# Patient Record
Sex: FEMALE | Race: WHITE
[De-identification: names, ages, dates, MRNs, and addresses within clinical notes are randomized per-mention and may not be internally consistent; named-entity substitution may affect disease eponyms.]

---

## 2018-09-04 ENCOUNTER — HOSPITAL ENCOUNTER (EMERGENCY)
Dept: HOSPITAL 25 - UCCORT | Age: 83
Discharge: HOME | End: 2018-09-04
Payer: MEDICARE

## 2018-09-04 VITALS — DIASTOLIC BLOOD PRESSURE: 90 MMHG | SYSTOLIC BLOOD PRESSURE: 146 MMHG

## 2018-09-04 DIAGNOSIS — Z88.2: ICD-10-CM

## 2018-09-04 DIAGNOSIS — N39.0: Primary | ICD-10-CM

## 2018-09-04 PROCEDURE — 81003 URINALYSIS AUTO W/O SCOPE: CPT

## 2018-09-04 PROCEDURE — 99212 OFFICE O/P EST SF 10 MIN: CPT

## 2018-09-04 PROCEDURE — G0463 HOSPITAL OUTPT CLINIC VISIT: HCPCS

## 2018-09-04 PROCEDURE — 87086 URINE CULTURE/COLONY COUNT: CPT

## 2018-09-04 NOTE — UC
Complaint Female HPI





- HPI Summary


HPI Summary: 





urinary frequency x 1 day 


has been urinating multiple times yesterday and woke up at least 4 times last 

night 


no dysuria, no fever, no chills, no flank pain 





- History Of Current Complaint


Chief Complaint: UCGU


Stated Complaint: URINARY COMPLAINT


Time Seen by Provider: 09/04/18 11:23


Hx Obtained From: Patient


Onset/Duration: Gradual Onset, Lasting Days - 1, Still Present


Timing: Constant


Severity Initially: Moderate


Severity Currently: Moderate


Pain Intensity: 97


Character: Not Applicable


Alleviating Factor(s): Position


Associated Signs And Symptoms: Negative: Fever, Back Pain, Vaginal Bleeding/

Discharge, Vaginal Discharge, Nausea, Vomiting(# Of Episodes =), Genital 

Swelling, Genital Blisters, Retained Foregin Body (Specify)





- Allergies/Home Medications


Allergies/Adverse Reactions: 


 Allergies











Allergy/AdvReac Type Severity Reaction Status Date / Time


 


Sulfa (Sulfonamide Allergy Unknown Hives Verified 09/04/18 11:24





Antibiotics)     











Home Medications: 


 Home Medications





Acetaminophen [Arthritis Pain Relief] 2 cap PO Q8H 09/04/18 [History Confirmed 

09/04/18]


Calcium Citrate TAB* [Citracal TAB*] 200 mg PO BID 09/04/18 [History Confirmed 

09/04/18]


Multivitamin/Iron/Folic Acid [Centrum Adults Tablet] 1 each PO 09/04/18 [History

]


Omega-3 Fatty Acids/Fish Oil [Fish Oil 1,000 mg Capsule] 1 each PO BID 09/04/18 

[History Confirmed 09/04/18]


Otc Arthritis Med 1 tab PO BID 09/04/18 [History]











PMH/Surg Hx/FS Hx/Imm Hx





- Additional Past Medical History


Additional PMH: 





DVT x 2





- Surgical History


Surgical History: Yes


Surgery Procedure, Year, and Place: Lt KNEE - REPAIRED MENISCUS.  Rt FOOT- 

TENDON REPAIRED





- Family History


Known Family History: Positive: Hypertension





- Social History


Alcohol Use: Daily


Substance Use Type: None


Smoking Status (MU): Never Smoked Tobacco





Review of Systems


Constitutional: Negative


Skin: Negative


Eyes: Negative


ENT: Negative


Respiratory: Negative


Cardiovascular: Negative


Gastrointestinal: Negative


Genitourinary: Frequency


Is Patient Immunocompromised?: No


All Other Systems Reviewed And Are Negative: Yes





Physical Exam


Triage Information Reviewed: Yes


Appearance: Well-Appearing, No Pain Distress, Well-Nourished


Vital Signs: 


 Initial Vital Signs











Temp  98.4 F   09/04/18 11:31


 


Pulse  84   09/04/18 11:31


 


Resp  18   09/04/18 11:31


 


BP  146/90   09/04/18 11:31


 


Pulse Ox  97   09/04/18 11:31











Vital Signs Reviewed: Yes


Eyes: Positive: Conjunctiva Clear


ENT: Positive: Normal ENT inspection, Hearing grossly normal, Pharynx normal


Neck: Positive: Supple, Nontender, No Lymphadenopathy


Respiratory: Positive: Chest non-tender, Lungs clear, Normal breath sounds


Cardiovascular: Positive: RRR, No Murmur, Pulses Normal


Abdominal Exam: Normal


Abdomen Description: Positive: Nontender, No Organomegaly, Soft.  Negative: CVA 

Tenderness (R), CVA Tenderness (L), Distended, Guarding


Bowel Sounds: Positive: Present


Skin Exam: Normal





 Complaint Female Dx





- Differential Dx/Diagnosis


Provider Diagnoses: UTI





Discharge





- Sign-Out/Discharge


Documenting (check all that apply): Patient Departure


All imaging exams completed and their final reports reviewed: No Studies





- Discharge Plan


Condition: Stable


Disposition: HOME


Prescriptions: 


Nitrofurantoin Monohyd/M-Cryst [Macrobid 100 mg Capsule] 100 mg PO BID #14 cap


Patient Education Materials:  Urinary Tract Infection in Older Adults (ED)


Referrals: 


Cherry Juarez MD [Primary Care Provider] - 7 Days





- Billing Disposition and Condition


Condition: STABLE


Disposition: Home

## 2018-12-19 ENCOUNTER — HOSPITAL ENCOUNTER (EMERGENCY)
Dept: HOSPITAL 25 - UCCORT | Age: 83
Discharge: HOME | End: 2018-12-19
Payer: MEDICARE

## 2018-12-19 VITALS — SYSTOLIC BLOOD PRESSURE: 146 MMHG | DIASTOLIC BLOOD PRESSURE: 86 MMHG

## 2018-12-19 DIAGNOSIS — L03.012: Primary | ICD-10-CM

## 2018-12-19 DIAGNOSIS — Z88.2: ICD-10-CM

## 2018-12-19 PROCEDURE — G0463 HOSPITAL OUTPT CLINIC VISIT: HCPCS

## 2018-12-19 PROCEDURE — 99211 OFF/OP EST MAY X REQ PHY/QHP: CPT

## 2018-12-19 NOTE — UC
Skin Complaint HPI





- HPI Summary


HPI Summary: 





Pt presents with c/o sudden onset of left index finger bruising and swelling.  

Pt is concerned that she has "cellulitis". Pt denies significant trauma or 

injury.  Pt reports that 3 days ago she felt sudden onset of mild tenderness in 

left index finger. Reports that left index finger is slightly swollen and 

bruised.  bruising has improved from initial onset.  





- History of Current Complaint


Chief Complaint: UCUpperExtremity


Time Seen by Provider: 12/19/18 10:08


Stated Complaint: LEFT INDEX FINGER CONCERN


Hx Obtained From: Patient


Pregnant?: No


Onset/Duration: Sudden Onset, Lasting Days, Still Present


Skin Exposure Onset/Duration: Days Ago


Timing: Constant


Onset Severity: Mild


Current Severity: Mild


Pain Intensity: 0


Location: Hand (Left) - index finger


Character: Swelling, Redness


Aggravating Factor(s): Nothing


Alleviating Factor(s): Unknown


Associated Signs & Symptoms: Positive: Bruising





- Allergy/Home Medications


Allergies/Adverse Reactions: 


 Allergies











Allergy/AdvReac Type Severity Reaction Status Date / Time


 


Sulfa (Sulfonamide Allergy Unknown Hives Verified 12/19/18 10:21





Antibiotics)     











Home Medications: 


 Home Medications





Acetaminophen [Tylenol Arthritis] 1,300 mg PO BID 12/19/18 [History Confirmed 12 /19/18]


Mirabegron (NF) [Myrbetriq (NF)] 25 mg PO DAILY 12/19/18 [History Confirmed 12/ 19/18]











PMH/Surg Hx/FS Hx/Imm Hx


Previously Healthy: Yes


Cardiovascular History: Cardiac Disease





- Surgical History


Surgical History: Yes


Surgery Procedure, Year, and Place: Lt KNEE - REPAIRED MENISCUS.  Rt FOOT- 

TENDON REPAIRED





- Family History


Known Family History: Positive: Hypertension





- Social History


Occupation: Retired


Alcohol Use: Daily


Alcohol Amount: 1 beer


Substance Use Type: None


Smoking Status (MU): Never Smoked Tobacco


Have You Smoked in the Last Year: No





Review of Systems


All Other Systems Reviewed And Are Negative: Yes


Constitutional: Positive: Negative


Skin: Positive: Bruising - left index finger


Eyes: Positive: Negative


ENT: Positive: Negative


Respiratory: Positive: Negative


Cardiovascular: Positive: Negative


Gastrointestinal: Positive: Negative


Genitourinary: Positive: Negative


Motor: Positive: Negative


Neurovascular: Positive: Negative


Musculoskeletal: Positive: Edema - left index finger


Neurological: Positive: Negative


Psychological: Positive: Negative


Is Patient Immunocompromised?: No





Physical Exam


Triage Information Reviewed: Yes


Appearance: Well-Appearing


Vital Signs: 


 Initial Vital Signs











Temp  98 F   12/19/18 10:25


 


Pulse  85   12/19/18 10:25


 


Resp  16   12/19/18 10:25


 


BP  146/86   12/19/18 10:25


 


Pulse Ox  97   12/19/18 10:25











Vital Signs Reviewed: Yes


Eye Exam: Normal


ENT Exam: Normal


Dental Exam: Normal


Neck exam: Normal


Respiratory Exam: Normal


Respiratory: Positive: Normal breath sounds


Cardiovascular Exam: Normal


Musculoskeletal: Positive: Edema @ - left index finger, mild edema,


Neurological Exam: Normal


Psychological Exam: Normal


Skin Exam: Other - generalized bruising to left index finger.





Course/Dx





- Differential Diagnoses - Skin Complaint


Differential Diagnoses: Cellulitis





- Diagnoses


Provider Diagnosis: 


 Thrombophlebitis of superficial veins of upper extremities








Discharge





- Sign-Out/Discharge


Documenting (check all that apply): Patient Departure


All imaging exams completed and their final reports reviewed: No Studies





- Discharge Plan


Condition: Stable


Disposition: HOME


Patient Education Materials:  Superficial Thrombophlebitis (ED)


Referrals: 


Cherry Juarez MD [Primary Care Provider] - If Needed





- Billing Disposition and Condition


Condition: STABLE


Disposition: Home